# Patient Record
Sex: FEMALE | Race: WHITE | NOT HISPANIC OR LATINO | Employment: OTHER | URBAN - METROPOLITAN AREA
[De-identification: names, ages, dates, MRNs, and addresses within clinical notes are randomized per-mention and may not be internally consistent; named-entity substitution may affect disease eponyms.]

---

## 2024-07-08 ENCOUNTER — APPOINTMENT (OUTPATIENT)
Dept: ADMISSIONS | Facility: MEDICAL CENTER | Age: 59
End: 2024-07-08
Attending: SURGERY
Payer: COMMERCIAL

## 2024-07-10 ENCOUNTER — PRE-ADMISSION TESTING (OUTPATIENT)
Dept: ADMISSIONS | Facility: MEDICAL CENTER | Age: 59
End: 2024-07-10
Attending: SURGERY
Payer: COMMERCIAL

## 2024-07-10 RX ORDER — LEVOTHYROXINE SODIUM 75 UG/1
75 TABLET ORAL
COMMUNITY

## 2024-07-10 RX ORDER — PYRIDOXINE HCL (VITAMIN B6) 50 MG
TABLET ORAL DAILY
COMMUNITY

## 2024-07-10 RX ORDER — DIAZEPAM 5 MG
5 TABLET ORAL EVERY 6 HOURS PRN
COMMUNITY

## 2024-07-10 RX ORDER — ANTIARTHRITIC COMBINATION NO.2 900 MG
25 TABLET ORAL DAILY
COMMUNITY

## 2024-07-10 RX ORDER — NIFEDIPINE 30 MG/1
30 TABLET, EXTENDED RELEASE ORAL DAILY
COMMUNITY

## 2024-07-10 RX ORDER — CITALOPRAM HYDROBROMIDE 40 MG/1
40 TABLET ORAL DAILY
COMMUNITY

## 2024-07-10 RX ORDER — CREAM BASE NO.37
1 CREAM (GRAM) TOPICAL DAILY
COMMUNITY

## 2024-07-10 NOTE — OR NURSING
"Pre-Admit RN appointment completed with pt, by this RN, over the phone, for surgery/procedure on 8/2/24. Discussed pre-procedure instructions, unless they have been otherwise instructed by their surgeon. Instructed pt on medication instructions referenced from \"Guideline for Pre-Operative Medication Management\", provided by [facility] to RN staff, unless otherwise instructed by prescribing MD or Surgeon. Pt verbalized understanding of all instructions. Pt denies any questions or concerns. Copy of Medication Reconciliation with instructions provided to pt via Email.     "

## 2024-08-02 ENCOUNTER — ANESTHESIA (OUTPATIENT)
Dept: SURGERY | Facility: MEDICAL CENTER | Age: 59
End: 2024-08-02
Payer: MEDICARE

## 2024-08-02 ENCOUNTER — ANESTHESIA EVENT (OUTPATIENT)
Dept: SURGERY | Facility: MEDICAL CENTER | Age: 59
End: 2024-08-02
Payer: MEDICARE

## 2024-08-02 ENCOUNTER — HOSPITAL ENCOUNTER (OUTPATIENT)
Facility: MEDICAL CENTER | Age: 59
End: 2024-08-02
Attending: SURGERY | Admitting: SURGERY
Payer: MEDICARE

## 2024-08-02 VITALS
HEART RATE: 75 BPM | DIASTOLIC BLOOD PRESSURE: 79 MMHG | SYSTOLIC BLOOD PRESSURE: 135 MMHG | BODY MASS INDEX: 23.17 KG/M2 | WEIGHT: 125.88 LBS | TEMPERATURE: 97.1 F | HEIGHT: 62 IN | RESPIRATION RATE: 18 BRPM | OXYGEN SATURATION: 97 %

## 2024-08-02 PROBLEM — D01.3: Chronic | Status: ACTIVE | Noted: 2024-08-02

## 2024-08-02 PROCEDURE — 700101 HCHG RX REV CODE 250: Performed by: ANESTHESIOLOGY

## 2024-08-02 PROCEDURE — 88305 TISSUE EXAM BY PATHOLOGIST: CPT

## 2024-08-02 PROCEDURE — 700111 HCHG RX REV CODE 636 W/ 250 OVERRIDE (IP): Performed by: ANESTHESIOLOGY

## 2024-08-02 PROCEDURE — 160027 HCHG SURGERY MINUTES - 1ST 30 MINS LEVEL 2: Performed by: SURGERY

## 2024-08-02 PROCEDURE — 160046 HCHG PACU - 1ST 60 MINS PHASE II: Performed by: SURGERY

## 2024-08-02 PROCEDURE — 160009 HCHG ANES TIME/MIN: Performed by: SURGERY

## 2024-08-02 PROCEDURE — 160035 HCHG PACU - 1ST 60 MINS PHASE I: Performed by: SURGERY

## 2024-08-02 PROCEDURE — 160002 HCHG RECOVERY MINUTES (STAT): Performed by: SURGERY

## 2024-08-02 PROCEDURE — 700101 HCHG RX REV CODE 250: Performed by: SURGERY

## 2024-08-02 PROCEDURE — 700105 HCHG RX REV CODE 258: Performed by: SURGERY

## 2024-08-02 PROCEDURE — 160025 RECOVERY II MINUTES (STATS): Performed by: SURGERY

## 2024-08-02 PROCEDURE — 160048 HCHG OR STATISTICAL LEVEL 1-5: Performed by: SURGERY

## 2024-08-02 RX ORDER — HALOPERIDOL 5 MG/ML
1 INJECTION INTRAMUSCULAR
Status: DISCONTINUED | OUTPATIENT
Start: 2024-08-02 | End: 2024-08-02 | Stop reason: HOSPADM

## 2024-08-02 RX ORDER — MIDAZOLAM HYDROCHLORIDE 1 MG/ML
1 INJECTION INTRAMUSCULAR; INTRAVENOUS
Status: DISCONTINUED | OUTPATIENT
Start: 2024-08-02 | End: 2024-08-02 | Stop reason: HOSPADM

## 2024-08-02 RX ORDER — ONDANSETRON 2 MG/ML
4 INJECTION INTRAMUSCULAR; INTRAVENOUS
Status: DISCONTINUED | OUTPATIENT
Start: 2024-08-02 | End: 2024-08-02 | Stop reason: HOSPADM

## 2024-08-02 RX ORDER — METOPROLOL TARTRATE 1 MG/ML
1 INJECTION, SOLUTION INTRAVENOUS
Status: DISCONTINUED | OUTPATIENT
Start: 2024-08-02 | End: 2024-08-02 | Stop reason: HOSPADM

## 2024-08-02 RX ORDER — EPHEDRINE SULFATE 50 MG/ML
5 INJECTION, SOLUTION INTRAVENOUS
Status: DISCONTINUED | OUTPATIENT
Start: 2024-08-02 | End: 2024-08-02 | Stop reason: HOSPADM

## 2024-08-02 RX ORDER — MEPERIDINE HYDROCHLORIDE 25 MG/ML
12.5 INJECTION INTRAMUSCULAR; INTRAVENOUS; SUBCUTANEOUS
Status: DISCONTINUED | OUTPATIENT
Start: 2024-08-02 | End: 2024-08-02 | Stop reason: HOSPADM

## 2024-08-02 RX ORDER — SODIUM CHLORIDE, SODIUM LACTATE, POTASSIUM CHLORIDE, CALCIUM CHLORIDE 600; 310; 30; 20 MG/100ML; MG/100ML; MG/100ML; MG/100ML
INJECTION, SOLUTION INTRAVENOUS CONTINUOUS
Status: DISCONTINUED | OUTPATIENT
Start: 2024-08-02 | End: 2024-08-02 | Stop reason: HOSPADM

## 2024-08-02 RX ORDER — LABETALOL HYDROCHLORIDE 5 MG/ML
5 INJECTION, SOLUTION INTRAVENOUS
Status: DISCONTINUED | OUTPATIENT
Start: 2024-08-02 | End: 2024-08-02 | Stop reason: HOSPADM

## 2024-08-02 RX ORDER — MIDAZOLAM HYDROCHLORIDE 1 MG/ML
INJECTION INTRAMUSCULAR; INTRAVENOUS PRN
Status: DISCONTINUED | OUTPATIENT
Start: 2024-08-02 | End: 2024-08-02 | Stop reason: SURG

## 2024-08-02 RX ORDER — HYDRALAZINE HYDROCHLORIDE 20 MG/ML
5 INJECTION INTRAMUSCULAR; INTRAVENOUS
Status: DISCONTINUED | OUTPATIENT
Start: 2024-08-02 | End: 2024-08-02 | Stop reason: HOSPADM

## 2024-08-02 RX ORDER — OXYCODONE HCL 5 MG/5 ML
5 SOLUTION, ORAL ORAL
Status: DISCONTINUED | OUTPATIENT
Start: 2024-08-02 | End: 2024-08-02 | Stop reason: HOSPADM

## 2024-08-02 RX ORDER — DIPHENHYDRAMINE HYDROCHLORIDE 50 MG/ML
12.5 INJECTION INTRAMUSCULAR; INTRAVENOUS
Status: DISCONTINUED | OUTPATIENT
Start: 2024-08-02 | End: 2024-08-02 | Stop reason: HOSPADM

## 2024-08-02 RX ORDER — IPRATROPIUM BROMIDE AND ALBUTEROL SULFATE 2.5; .5 MG/3ML; MG/3ML
3 SOLUTION RESPIRATORY (INHALATION)
Status: DISCONTINUED | OUTPATIENT
Start: 2024-08-02 | End: 2024-08-02 | Stop reason: HOSPADM

## 2024-08-02 RX ORDER — LIDOCAINE HYDROCHLORIDE 20 MG/ML
INJECTION, SOLUTION EPIDURAL; INFILTRATION; INTRACAUDAL; PERINEURAL PRN
Status: DISCONTINUED | OUTPATIENT
Start: 2024-08-02 | End: 2024-08-02 | Stop reason: SURG

## 2024-08-02 RX ORDER — BUPIVACAINE HYDROCHLORIDE AND EPINEPHRINE 5; 5 MG/ML; UG/ML
INJECTION, SOLUTION EPIDURAL; INTRACAUDAL; PERINEURAL
Status: DISCONTINUED | OUTPATIENT
Start: 2024-08-02 | End: 2024-08-02 | Stop reason: HOSPADM

## 2024-08-02 RX ORDER — ACETIC ACID 3 %
LIQUID (ML) MISCELLANEOUS
Status: DISCONTINUED | OUTPATIENT
Start: 2024-08-02 | End: 2024-08-02 | Stop reason: HOSPADM

## 2024-08-02 RX ORDER — OXYCODONE HCL 5 MG/5 ML
10 SOLUTION, ORAL ORAL
Status: DISCONTINUED | OUTPATIENT
Start: 2024-08-02 | End: 2024-08-02 | Stop reason: HOSPADM

## 2024-08-02 RX ADMIN — SODIUM CHLORIDE, POTASSIUM CHLORIDE, SODIUM LACTATE AND CALCIUM CHLORIDE: 600; 310; 30; 20 INJECTION, SOLUTION INTRAVENOUS at 12:18

## 2024-08-02 RX ADMIN — MIDAZOLAM HYDROCHLORIDE 2 MG: 2 INJECTION, SOLUTION INTRAMUSCULAR; INTRAVENOUS at 12:19

## 2024-08-02 RX ADMIN — PROPOFOL 50 MG: 10 INJECTION, EMULSION INTRAVENOUS at 12:30

## 2024-08-02 RX ADMIN — LIDOCAINE HYDROCHLORIDE 50 MG: 20 INJECTION, SOLUTION EPIDURAL; INFILTRATION; INTRACAUDAL at 12:22

## 2024-08-02 RX ADMIN — PROPOFOL 100 MG: 10 INJECTION, EMULSION INTRAVENOUS at 12:22

## 2024-08-02 RX ADMIN — PROPOFOL 50 MG: 10 INJECTION, EMULSION INTRAVENOUS at 12:27

## 2024-08-02 ASSESSMENT — PAIN DESCRIPTION - PAIN TYPE: TYPE: SURGICAL PAIN

## 2024-08-02 ASSESSMENT — PAIN SCALES - GENERAL: PAIN_LEVEL: 0

## 2024-08-02 NOTE — ANESTHESIA POSTPROCEDURE EVALUATION
Patient: Susi Naranjo    Procedure Summary       Date: 08/02/24 Room / Location: [place] [address] / SURGERY [facility]    Anesthesia Start: 1218 Anesthesia Stop: 1247    Procedure: HIGH-RESOLUTION ANOSCOPY WITH BIOPSY (Anus) Diagnosis: (anal intraepithelial neoplasia)    Surgeons: Gilson Flanagan MD, PhD Responsible Provider: Mikey Nick M.D.    Anesthesia Type: general ASA Status: 2            Final Anesthesia Type: general  Last vitals  BP   Blood Pressure: 106/57    Temp   36.8 °C (98.3 °F)    Pulse   84   Resp   (!) 26    SpO2   99 %      Anesthesia Post Evaluation    Patient location during evaluation: PACU  Patient participation: complete - patient participated  Level of consciousness: awake and alert  Pain score: 0    Airway patency: patent  Anesthetic complications: no  Cardiovascular status: hemodynamically stable  Respiratory status: acceptable  Hydration status: euvolemic    PONV: none          There were no known notable events for this encounter.

## 2024-08-02 NOTE — OR NURSING
1339 Pt arrives to phase II. VSS. Denies pain or nausea. Belongings returned to patient. No new prescriptions.    1354 Discharge instructions reviewed with the patient. All questions answered. Pt voided prior to dc. Iv removed. VSS.     Pt taken out via wheelchair to car. Pt states readiness for dc.

## 2024-08-02 NOTE — ANESTHESIA PREPROCEDURE EVALUATION
Case: [id number] Date/Time: 08/02/24 1145    Procedure: HIGH-RESOLUTION ANOSCOPY WITH BIOPSY    Pre-op diagnosis: ANAL INTRAEPITHELIAL NEOPLASIA (AIN III)    [facility] / SURGERY [facility]    Surgeons: Gilson Flanagan MD, PhD            Relevant Problems   No relevant active problems       Physical Exam    Airway   Mallampati: II  TM distance: >3 FB  Neck ROM: full       Cardiovascular - normal exam  Rhythm: regular  Rate: normal  (-) murmur     Dental - normal exam           Pulmonary - normal exam  Breath sounds clear to auscultation     Abdominal    Neurological - normal exam                   Anesthesia Plan    ASA 2       Plan - general       Airway plan will be mask          Induction: intravenous      Pertinent diagnostic labs and testing reviewed    Informed Consent:    Anesthetic plan and risks discussed with patient.    Use of blood products discussed with: patient whom consented to blood products.

## 2024-08-02 NOTE — H&P
Surgery General History & Physical Note    Date  8/2/2024    Primary Care Physician  Rozina Pro M.D.    CC  Anal intraepithelial neoplasia    HPI  This is a 58 y.o. female who presented with anal intraepithelial neoplasia presents for high resolution anoscopy.  Since her clinic visit she denies any new medications, new physicians or emergency room visits.    Past Medical History:   Diagnosis Date    Disorder of thyroid     High cholesterol     Hypertension     Psychiatric problem     depression and anxiety       Past Surgical History:   Procedure Laterality Date    CERVICAL DISK AND FUSION ANTERIOR      HYSTERECTOMY RADICAL      ORIF, FRACTURE, TIBIA Right     THYROIDECTOMY      TONSILLECTOMY         Current Facility-Administered Medications   Medication Dose Route Frequency Provider Last Rate Last Admin    lidocaine (Xylocaine) 1 % injection 0.5 mL  0.5 mL Intradermal Once PRN Gilson Flanagan MD, PhD        lactated ringers infusion   Intravenous Continuous Gilson Flanagan MD, PhD           Social History     Socioeconomic History    Marital status:      Spouse name: Not on file    Number of children: Not on file    Years of education: Not on file    Highest education level: Not on file   Occupational History    Not on file   Tobacco Use    Smoking status: Never    Smokeless tobacco: Never   Vaping Use    Vaping status: Never Used   Substance and Sexual Activity    Alcohol use: Not Currently    Drug use: Not Currently     Types: Inhaled     Comment: marijuana    Sexual activity: Not on file   Other Topics Concern    Not on file   Social History Narrative    Not on file     Social Determinants of Health     Financial Resource Strain: Not on file   Food Insecurity: Not on file   Transportation Needs: Not on file   Physical Activity: Not on file   Stress: Not on file   Social Connections: Not on file   Intimate Partner Violence: Not on file   Housing Stability: Not on file       History reviewed. No  pertinent family history.    Allergies  Flu virus vaccine and Wellbutrin [bupropion]    Review of Systems  Negative    Physical Exam  Vitals reviewed.   HENT:      Head: Normocephalic.      Nose: Nose normal.      Mouth/Throat:      Mouth: Mucous membranes are moist.   Eyes:      Extraocular Movements: Extraocular movements intact.   Cardiovascular:      Rate and Rhythm: Normal rate.      Pulses: Normal pulses.   Pulmonary:      Effort: Pulmonary effort is normal.   Abdominal:      General: Abdomen is flat.      Palpations: Abdomen is soft.   Musculoskeletal:         General: Normal range of motion.      Cervical back: Normal range of motion.   Skin:     General: Skin is warm and dry.   Neurological:      Mental Status: She is alert and oriented to person, place, and time.   Psychiatric:         Mood and Affect: Mood normal.         Behavior: Behavior normal.         Thought Content: Thought content normal.         Judgment: Judgment normal.         Vital Signs  Blood Pressure: 111/69   Temperature: 36.6 °C (97.9 °F)   Pulse: 61   Respiration: 16   Pulse Oximetry: 96 %       Labs:                    Radiology:  No orders to display         Assessment/Plan:  58-year-old female with a history of annulus epithelial neoplasia presents for high-resolution anoscopy  Procedure(s):  HIGH-RESOLUTION ANOSCOPY WITH BIOPSY  Risks, benefits, and alternatives were discussed with consenting person(s). Consenting person(s) were given an opportunity to ask questions and discuss other options. Risks including but not limited to failed or incomplete planned procedure, ineffective therapy, perforation, infection, bleeding, missed lesion(s), missed diagnosis, cardiac and/or pulmonary event, aspiration, stroke, possible need for surgery, hospitalization possibly prolonged, discomfort, unsuccessful and/or incomplete procedure, possible need for repeat procedures and/or additional testings, damage to adjacent organs and/or vascular  structures, medication reaction, disability, death, and other adverse events possibly life-threatening. Discussion was undertaken with Layman's terms. Consenting persons stated understanding and acceptance of these risks, and wished to proceed. Consent was given in clear state of mind.  Gilson Flanagan MD PhD  [facility]  Colon and Rectal Surgeon  [phone]

## 2024-08-02 NOTE — DISCHARGE INSTRUCTIONS
HOME CARE INSTRUCTIONS    ACTIVITY: Rest and take it easy for the first 24 hours.  A responsible adult is recommended to remain with you during that time.  It is normal to feel sleepy.  We encourage you to not do anything that requires balance, judgment or coordination.    FOR 24 HOURS DO NOT:  Drive, operate machinery or run household appliances.  Drink beer or alcoholic beverages.  Make important decisions or sign legal documents.    SPECIAL INSTRUCTIONS: .  Carefully remove bandage on the day after surgery. You may shower. Good hygiene is essential for proper healing.  Wearing soft gauze pads or sanitary napkins in your underwear helps to control fluid drainage, discharge of mucous, and bleeding. Change pads and underwear frequently.    2. There are no specific dietary restrictions associated with this surgery. Avoid foods which make you constipated. Be sure to include wheat bran, fresh fruits and plenty of vegetables in your diet. Drink plenty of water.    3. Avoid straining, especially a few days after surgery, when you may experience swelling that feels like an incompletely passed stool or a hemorrhoid. Milk of magnesia should be taken every day starting day of surgery until your first bowel movement. Additionally, we advise that you use an over-the-counter stool softener, such as Colace. Please call our office if no bowel movement within 72 hours.    4. Moderate exercise is healthy. You may walk as much as you like. You are permitted to go up and down stairs slowly while using a handrail. Resume normal activities after first postoperative clinic visit. Avoid heavy lifting or strenuous activity until that time.  5. You may drive when you are comfortable enough to react and move quickly in an emergency (usually 1-2 weeks after surgery). Long trips over 25 miles are not advised. Do not drive if you are taking prescription pain medication.    6. Return to work when you feel you are able o work with the  restrictions as noted above.    7. For pain, one of the Ibuprofen compounds (Advil, Nuprin, etc.) or Tylenol is suggested. Should these not be effective in managing your discomfort, prescription pain medications should be used sparingly to avoid constipation.     8. Please call the office to report any of the following:    Temperature of 101 or higher.  Signs of infection, possibly including increasing redness, warmth,   tenderness, drainage, odor at incision site  Persistent moderate to severe pain  No bowel movement within 72 hours from OR date.       DIET: To avoid nausea, slowly advance diet as tolerated, avoiding spicy or greasy foods for the first day.  Add more substantial food to your diet according to your physician's instructions.  Babies can be fed formula or breast milk as soon as they are hungry.  INCREASE FLUIDS AND FIBER TO AVOID CONSTIPATION.      MEDICATIONS: Resume taking daily medication.  Take prescribed pain medication with food.  If no medication is prescribed, you may take non-aspirin pain medication if needed.  PAIN MEDICATION CAN BE VERY CONSTIPATING.  Take a stool softener or laxative such as senokot, pericolace, or milk of magnesia if needed.    Prescription given for ________.  Last pain medication given at _______.    A follow-up appointment should be arranged with your doctor in 2 weeks; call to schedule.    You should CALL YOUR PHYSICIAN if you develop:  Fever greater than 101 degrees F.  Pain not relieved by medication, or persistent nausea or vomiting.  Excessive bleeding (blood soaking through dressing) or unexpected drainage from the wound.  Extreme redness or swelling around the incision site, drainage of pus or foul smelling drainage.  Inability to urinate or empty your bladder within 8 hours.  Problems with breathing or chest pain.    You should call 911 if you develop problems with breathing or chest pain.  If you are unable to contact your doctor or surgical center, you should  go to the nearest emergency room or urgent care center.  Physician's telephone #: [phone]    MILD FLU-LIKE SYMPTOMS ARE NORMAL.  YOU MAY EXPERIENCE GENERALIZED MUSCLE ACHES, THROAT IRRITATION, HEADACHE AND/OR SOME NAUSEA.    If any questions arise, call your doctor.  If your doctor is not available, please feel free to call the Surgical Center at [phone].  The Center is open Monday through Friday from 7AM to 7PM.      A registered nurse may call you a few days after your surgery to see how you are doing after your procedure.    You may also receive a survey in the mail within the next two weeks and we ask that you take a few moments to complete the survey and return it to us.  Our goal is to provide you with very good care and we value your comments.     Depression / Suicide Risk    As you are discharged from this [facility] facility, it is important to learn how to keep safe from harming yourself.    Recognize the warning signs:  Abrupt changes in personality, positive or negative- including increase in energy   Giving away possessions  Change in eating patterns- significant weight changes-  positive or negative  Change in sleeping patterns- unable to sleep or sleeping all the time   Unwillingness or inability to communicate  Depression  Unusual sadness, discouragement and loneliness  Talk of wanting to die  Neglect of personal appearance   Rebelliousness- reckless behavior  Withdrawal from people/activities they love  Confusion- inability to concentrate     If you or a loved one observes any of these behaviors or has concerns about self-harm, here's what you can do:  Talk about it- your feelings and reasons for harming yourself  Remove any means that you might use to hurt yourself (examples: pills, rope, extension cords, firearm)  Get professional help from the community (Mental Health, Substance Abuse, psychological counseling)  Do not be alone:Call your Safe Contact- someone whom you trust who will be  there for you.  Call your local CRISIS HOTLINE 057-2900 or 566-556-1465  Call your local Children's Mobile Crisis Response Team [place] (811) 533-8391 or www.[employer]  Call the toll free National Suicide Prevention Hotlines   National Suicide Prevention Lifeline 347-027-JAZQ (9505)  [facility] Line Network 800-SUICIDE (413-5424)    I acknowledge receipt and understanding of these Home Care instructions.

## 2024-08-02 NOTE — ANESTHESIA TIME REPORT
Anesthesia Start and Stop Event Times       Date Time Event    8/2/2024 1214 Ready for Procedure     1218 Anesthesia Start     1247 Anesthesia Stop          Responsible Staff  08/02/24      Name Role Begin End    Mikey Nick M.D. Anesth 1218 1247          Overtime Reason:  no overtime (within assigned shift)    Comments:

## 2024-08-07 LAB — PATHOLOGY CONSULT NOTE: NORMAL

## 2024-08-11 NOTE — OP REPORT
DATE OF OPERATION: 8/2/2024    PREOPERATIVE DIAGNOSIS:    Anal intraepithelial neoplasia     POSTOPERATIVE DIAGNOSIS:    Anal intraepithelial neoplasia    PROCEDURE PERFORMED:  1.  High-resolution anoscopy with rectal biopsy    SURGEON: Gilson Flanagan MD, PhD    ASSISTANT: None    ANESTHESIOLOGIST: Dr. Nick    ANESTHESIA:  General endotracheal anesthesia    ASA CLASSIFICATION: II.    INDICATION:  The patient is a 58 y.o. female with history of anal intraepithelial neoplasia. She is taken to the operating room for high-resolution anoscopy with rectal biopsy.    FINDINGS:  no abnormal staining identified.    WOUND CLASSIFICATION: Class III, Contaminated.    SPECIMEN:  biopsies taken at the dentate line in the left lateral quadrant.    ESTIMATED BLOOD LOSS:  5 mL.    PROCEDURE:    The patient was brought to the operating room placed under monitored anesthesia care with bilateral SCDs in place. The patient was then moved into the prone jackknife position.  a timeout was completed verifying the correct patient and procedure site positioning and availability of equipment prior to the start of surgery.  The buttocks were taped apart and the perineum was prepped and draped in the usual standard sterile fashion.  Half percent Marcaine with epinephrine was injected as a perianal nerve block. The anus was carefully dilated until 3 fingers could be introduced.  An anoscope was introduced and the 3 hemorrhoidal pedicles were identified.  Using a microscope I visualized the anal canal and the squamocolumnar junction and examined it carefully and found no areas of abnormalities.  I then placed 3% acetic acid within the anal canal left in place for 1 minute.  I then made a global survey of the entire anal canal and observe no abnormalities.  I then proceeded quadrant by quadrant with Lugol's solution and identified no abnormalities.  I did take biopsies in the right posterior and left lateral position and used electrocautery  for hemostasis.  I removed the anoscope and reapplied the acetic acid to the anal verge and then carefully examined and found no masses or abnormalities.  At the conclusion of the case the sponge and instrument counts were correct x2.  A gauze pad was tucked between the gluteal folds. The patient tolerated the procedure well and was awakened and taken to the postanesthesia care unit in stable condition.

## (undated) DEVICE — LACTATED RINGERS INJ 1000 ML - (14EA/CA 60CA/PF)

## (undated) DEVICE — SURGIFOAM (SIZE 100) - (6EA/CA)

## (undated) DEVICE — SET EXTENSION WITH 2 PORTS (48EA/CA) ***PART #2C8610 IS A SUBSTITUTE*****

## (undated) DEVICE — WATER IRRIGATION STERILE 1000ML (12EA/CA)

## (undated) DEVICE — CONTAINER SPECIMEN BAG OR - STERILE 4 OZ W/LID (100EA/CA)

## (undated) DEVICE — GOWN WARMING STANDARD FLEX - (30/CA)

## (undated) DEVICE — SPONGE GAUZE NON-STERILE 4X4 - (2000/CA 10PK/CA)

## (undated) DEVICE — SENSOR OXIMETER ADULT SPO2 RD SET (20EA/BX)

## (undated) DEVICE — GLOVE SZ 8 BIOGEL PI MICRO - PF LF (50PR/BX)

## (undated) DEVICE — DRAPE LAPAROTOMY T SHEET - (12EA/CA)

## (undated) DEVICE — PACK MINOR BASIN - (2EA/CA)

## (undated) DEVICE — APPLICATOR COTTON TIP 6 IN - STERILE (2EA/PK 100PK/BX)

## (undated) DEVICE — BAG SPONGE COUNT 10.25 X 32 - BLUE (250/CA)

## (undated) DEVICE — BOVIE NEEDLE TIP 3CM COLORADO